# Patient Record
(demographics unavailable — no encounter records)

---

## 2025-03-12 NOTE — BIRTH HISTORY
[Failed] : failed [At ___ Weeks Gestation] : at [unfilled] weeks gestation [ Section] : by  section [None] : No delivery complications [de-identified] : NO NICU stay [de-identified] : Gestational Diabetes [FreeTextEntry1] : ONLY in Right ear

## 2025-03-12 NOTE — CONSULT LETTER
[Dear  ___] : Dear  [unfilled], [Consult Letter:] : I had the pleasure of evaluating your patient, [unfilled]. [Please see my note below.] : Please see my note below. [Consult Closing:] : Thank you very much for allowing me to participate in the care of this patient.  If you have any questions, please do not hesitate to contact me. [Sincerely,] : Sincerely, [FreeTextEntry2] : Noelle Brady,  [phone] [FreeTextEntry3] : Ayush Gonzales MD Pediatric Otolaryngology [address] [address] Tel [phone] Fax [phone]

## 2025-03-12 NOTE — DATA REVIEWED
[FreeTextEntry1] :  I ordered, reviewed and interpreted today's audiometric testing myself and discussed the results with the family.   Suspected Diagnosis: bilateral conductive hearing loss:   My interpretation is in keeping with:   Right ear: type B tympanogram (likely due to Eustachian tube dysfunction or effusion) Left ear:  type B tympanogram (likely due to Eustachian tube dysfunction or effusion)   Unable to obtain natural sleep which made ABR testing impossible Failed OAEs

## 2025-03-12 NOTE — REASON FOR VISIT
[Initial Consultation] : an initial consultation for [Parents] : parents [FreeTextEntry2] : failed hearing test x2 in Right ear

## 2025-03-12 NOTE — HISTORY OF PRESENT ILLNESS
[No Personal or Family History of Easy Bruising, Bleeding, or Issues with General Anesthesia] : No Personal or Family History of easy bruising, bleeding, or issues with general anesthesia [de-identified] : 36 day old infant boy presents for initial evaluation for failed hearing test x2 ONLY in the Right ear.  Referred by [facility] Parents states he does respond to noises.  No recent ear infections.  CMV test NEGATIVE 90% formula mixed with breastmilk. 3 oz every 2-3 hours.  5-6 wet diapers per day. No throat/tonsil infections. Speech can not be evaluated at this time.

## 2025-04-18 NOTE — BIRTH HISTORY
[At Term] : at term [ Section] : by  section [No complications] : there were no prenatal complications [Failure to Progress] : failure to progress

## 2025-04-25 NOTE — PROCEDURE
[Normal Cochlear] : consistent with normal cochlear outer hair cell function  [OAE Present (Right)] : otoacoustic emissions present right ear [Normal Eardrum Mobility] : consistent with normal eardrum mobility [Type A Tympanogram] : Type A Normal [] : Auditory Brainstem Response: [Threshold] : threshold [___dBnHL] : 4000 Hz: [unfilled] dBnHL [Natural Sleep] : natural sleep [Clear Wavefoms] : clear waveforms  [ABR responses to ___/sec] : responses to [unfilled] /sec

## 2025-04-25 NOTE — CONSULT LETTER
[Dear  ___] : Dear  [unfilled], [Consult Letter:] : I had the pleasure of evaluating your patient, [unfilled]. [Please see my note below.] : Please see my note below. [Consult Closing:] : Thank you very much for allowing me to participate in the care of this patient.  If you have any questions, please do not hesitate to contact me. [Sincerely,] : Sincerely, [FreeTextEntry3] : Arnold Moran CCC-A Audiology Supervisor  Hearing Screening Program [address] [place] [address] [phone]

## 2025-04-25 NOTE — PLAN
[FreeTextEntry2] : 1) Continued otologic evaluation re: newly diagnosed hearing loss.  2) Referral to [place] [facility] for supportive educational services and developmental monitoring. 3) Repeat ABR evaluation. 4) Further recommendations, including consideration of amplification for the right ear (traditional HA vs BAHA) pending results of CT/MRI scans and behavioral results after 8 months of age.

## 2025-04-25 NOTE — CONSULT LETTER
[Dear  ___] : Dear  [unfilled], [Consult Letter:] : I had the pleasure of evaluating your patient, [unfilled]. [Please see my note below.] : Please see my note below. [Consult Closing:] : Thank you very much for allowing me to participate in the care of this patient.  If you have any questions, please do not hesitate to contact me. [Sincerely,] : Sincerely, [FreeTextEntry3] : Arnold Moran CCC-A Audiology Supervisor  Hearing Screening Program [address] [place] [address] [phone]

## 2025-04-25 NOTE — HISTORY OF PRESENT ILLNESS
[FreeTextEntry1] : PILAR  is a 2 month old male seen for initial diagnostic ABR evaluation following failed  hearing screening at [facility] in the right ear. Family history of hearing loss denied. Seen by Dr. Gonzales, ENT, on 3/12/25 at which time middle ear effusion was noted bilaterally.

## 2025-04-25 NOTE — ASSESSMENT
[FreeTextEntry1] : ABR is an an objective test that measures brainstem activity in response to acoustic stimuli. It evaluates the integrity of the hearing system from the level of the cochlea through the lower brainstem. From this, we are able to gather data to estimate hearing thresholds. Please note thresholds are reported in dBnHL. Diagnostic statement includes a correction factor of -20 dB at 500Hz. -15 dB at 1000Hz, -10 dB at 2000Hz and -5 dB at 4000Hz.   RESULTS  LEFT EAR: Estimated hearing thresholds obtained within normal limits 500 and 2-4kHz.   RIGHT EAR:  Results of today's ABR evaluation are grossly abnormal in the right ear.  A cochlear microphonic was obtained in response to click stimulus at 95dBnHL and there is an inversion of the waveform with change in stimulus polarity. Clamped run utilized to rule out artifact. No measurable wave V was obtained in response to toneburst stimuli via air conduction 500 and 2-4kHz in the right ear. 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   Results and recommendations discussed with parents. Advised recommendation is to defer fitting of amplification until etiology of hearing loss can be determined (ie Auditory Neuropathy vs cochlear nerve deficiency) and results of behavioral testing for right ear. Early Intervention is recommended for developmental monitoring. Parents expressed understanding and will pursue Early Intervention enrollment.

## 2025-04-25 NOTE — REASON FOR VISIT
[Initial] : initial visit for [Other: _____] : [unfilled]  [Parents] : parents [Medical Records] : medical records [FreeTextEntry1] : Dr. Gonzales/BRENNON Sánchez

## 2025-07-03 NOTE — HISTORY OF PRESENT ILLNESS
[FreeTextEntry1] : Michael, a 4-month-old male, was seen for a repeat ABR evaluation. Failed  [facility] in the right ear. Family history of hearing loss denied. Seen by Dr. Gonzales, ENT, on 3/12/25 at which time middle ear effusion was noted bilaterally. Initial ABR at this Center in 2025 indicated normal hearing sensitivity for the left ear, grossly abnormal ABR for the right ear. TEOAEs present right ear. 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 [FreeTextEntry8] : -Parents have not yet been in contact with Early Intervention and have not seen ENT for follow up. -Parents unaware than Michael needed to sleep for scheduled ABR. Parents reported Anel was not sleepy as he recently awoke.

## 2025-07-03 NOTE — PROCEDURE
[OAE Present (Left)] : otoacoustic emissions present left ear [OAE Present (Right)] : otoacoustic emissions absent right ear [] : Acoustic Immittance: [1000 Hz] : 1000 Hz [VRA] : Visual Reinforcement Audiometry [Insert Ear Phones] : insert ear phones [de-identified] : difficult to interpret [de-identified] : Did not sleep for ABR. [de-identified] : SDT- obtained at 25 dBHL for the left ear, no response and no startle noted at 90 dBHL for the right ear. Could not condition reliably to tones. [de-identified] : SDT- fair, Tones- CNC

## 2025-07-03 NOTE — REASON FOR VISIT
[Follow-Up] : follow-up for [Other: _____] : [unfilled]  [FreeTextEntry1] : Dr. Gonzales/BRENNON Sánchez [Parents] : parents [Medical Records] : medical records

## 2025-07-03 NOTE — PLAN
[FreeTextEntry2] : 1. F/up with ENT, Dr. Benz. 2. Behavioral audiological evaluation, 3 months. 3. Referral to Early Intervention. Further recommendations pending results of imaging and behavioral audiological evaluation.

## 2025-07-03 NOTE — ASSESSMENT
[FreeTextEntry1] : Limited results obtained today. Results and recommendations discussed with parents based on limited findings from today in combination with results from ABR in April 2025. Discussed unilateral hearing loss and potential impacts on speech and language development, importance of early identification and management of hearing loss. Counseled regarding importance of imaging to guide recommendations.   Reviewed recommendation to defer fitting of amplification until etiology of hearing loss can be determined (ie Auditory Neuropathy vs cochlear nerve deficiency) and obtain reliable behavioral testing results for the right ear.   Provided parents with Early Intervention contact information as it is recommended for developmental monitoring.